# Patient Record
Sex: FEMALE | Race: WHITE
[De-identification: names, ages, dates, MRNs, and addresses within clinical notes are randomized per-mention and may not be internally consistent; named-entity substitution may affect disease eponyms.]

---

## 2017-03-08 ENCOUNTER — HOSPITAL ENCOUNTER (OUTPATIENT)
Dept: HOSPITAL 80 - CIMAGING | Age: 77
End: 2017-03-08
Attending: GENERAL ACUTE CARE HOSPITAL
Payer: COMMERCIAL

## 2017-03-08 DIAGNOSIS — Z12.31: Primary | ICD-10-CM

## 2017-03-08 PROCEDURE — G0202 SCR MAMMO BI INCL CAD: HCPCS

## 2018-03-09 ENCOUNTER — HOSPITAL ENCOUNTER (OUTPATIENT)
Dept: HOSPITAL 80 - CIMAGING | Age: 78
End: 2018-03-09
Attending: SPECIALIST
Payer: COMMERCIAL

## 2018-03-09 DIAGNOSIS — Z12.31: Primary | ICD-10-CM

## 2018-06-05 ENCOUNTER — HOSPITAL ENCOUNTER (EMERGENCY)
Dept: HOSPITAL 80 - CED | Age: 78
Discharge: HOME | End: 2018-06-05
Payer: COMMERCIAL

## 2018-06-05 VITALS — SYSTOLIC BLOOD PRESSURE: 138 MMHG | DIASTOLIC BLOOD PRESSURE: 64 MMHG

## 2018-06-05 DIAGNOSIS — S92.421A: Primary | ICD-10-CM

## 2018-06-05 DIAGNOSIS — Z79.82: ICD-10-CM
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
## 2018-06-05 DIAGNOSIS — J45.909: ICD-10-CM

## 2018-06-05 DIAGNOSIS — I10: ICD-10-CM

## 2018-06-05 PROCEDURE — 73630 X-RAY EXAM OF FOOT: CPT

## 2018-06-05 PROCEDURE — L4386 NON-PNEUM WALK BOOT PRE CST: HCPCS

## 2018-06-05 PROCEDURE — 99283 EMERGENCY DEPT VISIT LOW MDM: CPT

## 2018-06-05 NOTE — EDPHY
H & P


Time Seen by Provider: 06/05/18 17:12


HPI/ROS: 





HPI





CHIEF COMPLAINT:  Right foot pain





HISTORY OF PRESENT ILLNESS:  This is a 77-year-old female she is otherwise 

healthy, presents emergency room with right foot pain.  She was moving heavy 

table around noon today and dropped on her right foot.  This caused immediate 

pain to her right great toe and right 2nd toe.  She now states the pain is 

minimal but she has swelling ecchymosis noted.  She came to the emergency room 

for evaluation.


Pain is minimal at this time 1/10.





Past Medical History:  Hypertension, hyperlipidemia, asthma





Past Surgical History:  Denies recent surgery





Social History:  Denies daily use of drugs alcohol tobacco.





Family History:  Noncontributory








ROS   


REVIEW OF SYSTEMS:


A comprehensive 10 point review of systems is otherwise negative aside from 

elements mentioned in the history of present illness.








Exam   


Constitutional   triage nursing summary reviewed, vital signs reviewed, awake/

alert. 


Eyes   normal conjunctivae and sclera, EOMI, PERRLA. 


HENT   normal inspection, atraumatic, moist mucus membranes, no epistaxis, neck 

supple/ no meningismus, no raccoon eyes. 


Respiratory   clear to auscultation bilaterally, normal breath sounds, no 

respiratory distress, no wheezing. 


Cardiovascular   rate normal, regular rhythm, no murmur, no edema, distal 

pulses normal. 


Gastrointestinal   soft, non-tender, no rebound, no guarding, normal bowel 

sounds, no distension, no pulsatile mass. 


Genitourinary   no CVA tenderness. 


Musculoskeletal right foot:  Good distal pulse, good cap refill, noted over the 

1st and 2nd toe there is ecchymosis and swelling present.  She does have full 

range of motion.  Over the area of the 1st great toe there is a very small 

laceration at the base of the nail bed.  It is not large enough to repair.  No 

subungual hematoma.  Good cap refill.  Full range of motion.


Skin   pink, warm, & dry, no rash, skin atraumatic. 


Neurologic   awake, alert and oriented x 3, AAOx3, moves all 4 extremities 

equally, motor intact, sensory intact, CN II-XII intact, normal cerebellar, 

normal vision, normal speech. 


Psychiatric   normal mood/affect. 


Heme/Lymph/Immune   no lymphadenopathy.





Differential Diagnosis:  Includes but is not limited to in a particular order 

foot fracture, toe fracture, toe contusion, soft tissue injury, now been injury

, nail fracture, nail bed laceration.





Medical Decision Making:  Plan for this patient x-ray the right foot, declined 

pain medicine here in emergency room.  Most likely placed in a walking boot, 

and recommend ice, elevation, anti-inflammatory pain medicine.  Will 

additionally recommend following up with orthopedics/podiatry Dr. Kimbrough.





Re-evaluation:














X-ray of the foot reviewed.  Shows no acute fracture of the right great toe.





Patient has been splayed stayed in a walking boot.





Recommend she follows up with Orthopedics.





Recommending ice, elevation anti-inflammatories and rest.





I discussed this with the patient she understands.


Source: Patient





- Personal History


Tetanus Vaccine Date: < 10 years





- Medical/Surgical History


Hx Asthma: Yes


Hx Chronic Respiratory Disease: No


Hx Diabetes: No


Hx Cardiac Disease: Yes


Hx Renal Disease: No


Hx Cirrhosis: No


Hx Alcoholism: No


Hx HIV/AIDS: No


Hx Splenectomy or Spleen Trauma: No


Other PMH: hyperlipidemia, HTN, asthma





- Social History


Smoking Status: Never smoked


Constitutional: 


 Initial Vital Signs











Temperature (C)  36.6 C   06/05/18 17:12


 


Heart Rate  62   06/05/18 17:12


 


Respiratory Rate  20   06/05/18 17:12


 


Blood Pressure  138/64 H  06/05/18 17:12


 


O2 Sat (%)  94   06/05/18 17:12








 











O2 Delivery Mode               Room Air














Allergies/Adverse Reactions: 


 





ampicillin Allergy (Verified 06/05/18 17:19)


 








Home Medications: 














 Medication  Instructions  Recorded


 


ALBUTEROL SULFATE PRN 12/10/16


 


ASPIRIN  12/10/16


 


Benicar  12/10/16


 


NIFEdipine  12/10/16


 


SIMVASTATIN  12/10/16


 


Calcium  06/05/18


 


Fish Oil 1,000 mg Softgel  06/05/18


 


Vitamin D3  06/05/18














Medical Decision Making





- Diagnostics


Imaging Results: 


 Imaging Impressions





Foot X-Ray  06/05/18 17:21


Impression: Acute fracture distal phalanx great toe.


 














Departure





- Departure


Disposition: Home, Routine, Self-Care


Condition: Good


Instructions:  Toe Fracture (ED)


Additional Instructions: 


1. Recommend icing over the next 48 hr


2. Keeping it elevated help swelling and pain.


3. Anti-inflammatory pain medicine like Tylenol Motrin.


4. Follow up with Orthopedics.


Referrals: 


REY DUGAN [Primary Care Provider] - As per Instructions


Jaylen Kimbrough MD [Medical Doctor] - As per Instructions

## 2019-03-26 ENCOUNTER — HOSPITAL ENCOUNTER (OUTPATIENT)
Dept: HOSPITAL 80 - CIMAGING | Age: 79
End: 2019-03-26
Attending: SPECIALIST
Payer: COMMERCIAL

## 2019-03-26 DIAGNOSIS — Z12.31: Primary | ICD-10-CM
